# Patient Record
Sex: FEMALE | URBAN - METROPOLITAN AREA
[De-identification: names, ages, dates, MRNs, and addresses within clinical notes are randomized per-mention and may not be internally consistent; named-entity substitution may affect disease eponyms.]

---

## 2024-11-18 ENCOUNTER — APPOINTMENT (OUTPATIENT)
Dept: URBAN - METROPOLITAN AREA SURGERY 12 | Age: 29
Setting detail: DERMATOLOGY
End: 2024-11-18

## 2024-11-18 DIAGNOSIS — D485 NEOPLASM OF UNCERTAIN BEHAVIOR OF SKIN: ICD-10-CM

## 2024-11-18 PROBLEM — D48.5 NEOPLASM OF UNCERTAIN BEHAVIOR OF SKIN: Status: ACTIVE | Noted: 2024-11-18

## 2024-11-18 PROCEDURE — 11102 TANGNTL BX SKIN SINGLE LES: CPT

## 2024-11-18 PROCEDURE — OTHER BIOPSY BY SHAVE METHOD: OTHER

## 2024-11-18 ASSESSMENT — LOCATION ZONE DERM: LOCATION ZONE: FACE

## 2024-11-18 ASSESSMENT — LOCATION DETAILED DESCRIPTION DERM: LOCATION DETAILED: RIGHT FOREHEAD

## 2024-11-18 ASSESSMENT — LOCATION SIMPLE DESCRIPTION DERM: LOCATION SIMPLE: RIGHT FOREHEAD

## 2024-11-18 NOTE — PROCEDURE: BIOPSY BY SHAVE METHOD
Detail Level: Detailed
Depth Of Biopsy: dermis
Was A Bandage Applied: Yes
Size Of Lesion In Cm: 0
Biopsy Type: H and E
Biopsy Method: Dermablade
Anesthesia Type: 1% lidocaine with epinephrine
Anesthesia Volume In Cc: 0.5
Hemostasis: Drysol
Wound Care: Petrolatum
Dressing: bandage
Destruction After The Procedure: No
Type Of Destruction Used: Curettage
Curettage Text: The wound bed was treated with curettage after the biopsy was performed.
Cryotherapy Text: The wound bed was treated with cryotherapy after the biopsy was performed.
Electrodesiccation Text: The wound bed was treated with electrodesiccation after the biopsy was performed.
Electrodesiccation And Curettage Text: The wound bed was treated with electrodesiccation and curettage after the biopsy was performed.
Silver Nitrate Text: The wound bed was treated with silver nitrate after the biopsy was performed.
Lab: [id number]
Lab Facility: [id number]
Consent: Written consent was obtained and risks were reviewed including but not limited to scarring, infection, bleeding, scabbing, incomplete removal, nerve damage and allergy to anesthesia.
Post-Care Instructions: I reviewed with the patient in detail post-care instructions. Patient is to keep the biopsy site dry overnight, and then apply bacitracin twice daily until healed. Patient may apply hydrogen peroxide soaks to remove any crusting.
Notification Instructions: Patient will be notified of biopsy results. However, patient instructed to call the office if not contacted within 2 weeks.
Billing Type: Third-Party Bill
Information: Selecting Yes will display possible errors in your note based on the variables you have selected. This validation is only offered as a suggestion for you. PLEASE NOTE THAT THE VALIDATION TEXT WILL BE REMOVED WHEN YOU FINALIZE YOUR NOTE. IF YOU WANT TO FAX A PRELIMINARY NOTE YOU WILL NEED TO TOGGLE THIS TO 'NO' IF YOU DO NOT WANT IT IN YOUR FAXED NOTE.

## 2024-12-11 ENCOUNTER — APPOINTMENT (OUTPATIENT)
Dept: URBAN - METROPOLITAN AREA SURGERY 11 | Age: 29
Setting detail: DERMATOLOGY
End: 2024-12-11

## 2024-12-11 PROBLEM — C44.91 BASAL CELL CARCINOMA OF SKIN, UNSPECIFIED: Status: ACTIVE | Noted: 2024-12-11

## 2024-12-11 PROCEDURE — OTHER MOHS SURGERY PHONE CONSULTATION: OTHER

## 2024-12-11 NOTE — PROCEDURE: MOHS SURGERY PHONE CONSULTATION
Does The Patient Have A Pacemaker Or Defibrillator?: No
Time Of Mohs Surgery: 8am
Has The Pathology Report Been Received?: Yes
Date Of Mohs Surgery: 01/28/2025
Detail Level: Simple
Additional Information?: no imbruvica.  emailed info/directions
Which Antibiotic Do They Take For Surgical Prophylaxis?: Amoxicillin (2 grams)
Patient Reported Location: d
Office Location Of Mohs Surgery: [place]

## 2025-01-28 ENCOUNTER — APPOINTMENT (OUTPATIENT)
Dept: URBAN - METROPOLITAN AREA SURGERY 11 | Age: 30
Setting detail: DERMATOLOGY
End: 2025-01-28

## 2025-01-28 PROBLEM — C44.319 BASAL CELL CARCINOMA OF SKIN OF OTHER PARTS OF FACE: Status: ACTIVE | Noted: 2025-01-28

## 2025-01-28 PROCEDURE — OTHER MOHS BY LAYER: OTHER

## 2025-01-28 PROCEDURE — 17311 MOHS 1 STAGE H/N/HF/G: CPT

## 2025-01-28 PROCEDURE — OTHER MIPS QUALITY: OTHER

## 2025-01-28 PROCEDURE — 14040 TIS TRNFR F/C/C/M/N/A/G/H/F: CPT

## 2025-01-28 NOTE — PROCEDURE: MOHS BY LAYER
Body Location Override (Optional - Billing Will Still Be Based On Selected Body Map Location If Applicable): right forehead
Detail Level: Detailed
Size Of Lesion: 1.6
Anesthesia Type: 2% lidocaine with epinephrine and a 1:10 solution of sodium bicarbonate and clindamycin
Hemostasis: Electrodesiccation
Estimated Blood Loss (Cc): minimal
Bill For Surgical Tray: no
Number Of Stages: 1
Stage 1: Anesthesia Volume In Cc: 3
Stage 1: Depth Of Layer: dermis
Stage 1: Defect X-Dimension: 1.8
Stage 1: Defect Y-Dimension: 1.2
Stage 2: Number Of Sections (Blocks) ?: 0
Repair Type: Flap
Undermining Type: Entire Wound
Debridement Text: The wound edges were debrided prior to proceeding with the closure to facilitate wound healing.
Helical Rim Text: The closure involved the helical rim.
Vermilion Border Text: The closure involved the vermilion border.
Nostril Rim Text: The closure involved the nostril rim.
Retention Suture Text: Retention sutures were placed to support the closure and prevent dehiscence.
Flap Type: Advancement Flap (Single)
Secondary Defect Length (In Cm): 1.5
Deep Sutures: 5-0 Monocryl
Epidermal Closure: running
Suture Removal: 7 days
Wound Care: No ointment
Consent: The rationale for Mohs was explained to the patient and consent was obtained. The risks, benefits and alternatives to therapy were discussed in detail. Specifically, the risks of infection, scarring, bleeding, prolonged wound healing, incomplete removal, allergy to anesthesia, nerve injury and recurrence were addressed. Prior to the procedure, the treatment site was clearly identified and confirmed by the patient. All components of Universal Protocol/PAUSE Rule completed.  Procedure performed by Dr. Galicia who acts in two integrated and distinct capacities: surgeon and pathologist.
Post-Care Instructions: I reviewed with the patient in detail post-care instructions. Leave brown tape in place.  Patient is not to engage in any heavy lifting, exercise, or swimming for the next 14 days. Should the patient develop any fevers, chills, bleeding, severe pain patient will contact the office immediately.

## 2025-02-04 ENCOUNTER — APPOINTMENT (OUTPATIENT)
Dept: URBAN - METROPOLITAN AREA SURGERY 11 | Age: 30
Setting detail: DERMATOLOGY
End: 2025-02-04

## 2025-02-04 DIAGNOSIS — L57.8 OTHER SKIN CHANGES DUE TO CHRONIC EXPOSURE TO NONIONIZING RADIATION: ICD-10-CM

## 2025-02-04 DIAGNOSIS — L82.1 OTHER SEBORRHEIC KERATOSIS: ICD-10-CM

## 2025-02-04 DIAGNOSIS — D22 MELANOCYTIC NEVI: ICD-10-CM

## 2025-02-04 DIAGNOSIS — Z48.02 ENCOUNTER FOR REMOVAL OF SUTURES: ICD-10-CM

## 2025-02-04 DIAGNOSIS — Z85.828 PERSONAL HISTORY OF OTHER MALIGNANT NEOPLASM OF SKIN: ICD-10-CM

## 2025-02-04 DIAGNOSIS — L81.4 OTHER MELANIN HYPERPIGMENTATION: ICD-10-CM

## 2025-02-04 DIAGNOSIS — D18.0 HEMANGIOMA: ICD-10-CM

## 2025-02-04 PROBLEM — D22.5 MELANOCYTIC NEVI OF TRUNK: Status: ACTIVE | Noted: 2025-02-04

## 2025-02-04 PROBLEM — D18.01 HEMANGIOMA OF SKIN AND SUBCUTANEOUS TISSUE: Status: ACTIVE | Noted: 2025-02-04

## 2025-02-04 PROCEDURE — OTHER REASSURANCE: OTHER

## 2025-02-04 PROCEDURE — OTHER SUTURE REMOVAL (GLOBAL PERIOD): OTHER

## 2025-02-04 PROCEDURE — 99213 OFFICE O/P EST LOW 20 MIN: CPT | Mod: 24

## 2025-02-04 PROCEDURE — OTHER COUNSELING: OTHER

## 2025-02-04 ASSESSMENT — LOCATION SIMPLE DESCRIPTION DERM
LOCATION SIMPLE: RIGHT UPPER BACK
LOCATION SIMPLE: RIGHT FOREHEAD
LOCATION SIMPLE: LEFT UPPER BACK

## 2025-02-04 ASSESSMENT — LOCATION ZONE DERM
LOCATION ZONE: TRUNK
LOCATION ZONE: FACE

## 2025-02-04 ASSESSMENT — LOCATION DETAILED DESCRIPTION DERM
LOCATION DETAILED: LEFT MID-UPPER BACK
LOCATION DETAILED: LEFT SUPERIOR MEDIAL UPPER BACK
LOCATION DETAILED: RIGHT SUPERIOR UPPER BACK
LOCATION DETAILED: LEFT SUPERIOR UPPER BACK
LOCATION DETAILED: RIGHT SUPERIOR FOREHEAD

## 2025-02-04 NOTE — PROCEDURE: SUTURE REMOVAL (GLOBAL PERIOD)
Detail Level: Detailed
Add 96676 Cpt? (Important Note: In 2017 The Use Of 75890 Is Being Tracked By Cms To Determine Future Global Period Reimbursement For Global Periods): no

## 2025-08-06 ENCOUNTER — APPOINTMENT (OUTPATIENT)
Dept: URBAN - METROPOLITAN AREA SURGERY 12 | Age: 30
Setting detail: DERMATOLOGY
End: 2025-08-06

## 2025-08-06 DIAGNOSIS — L81.4 OTHER MELANIN HYPERPIGMENTATION: ICD-10-CM

## 2025-08-06 DIAGNOSIS — Z85.828 PERSONAL HISTORY OF OTHER MALIGNANT NEOPLASM OF SKIN: ICD-10-CM

## 2025-08-06 DIAGNOSIS — Z87.2 PERSONAL HISTORY OF DISEASES OF THE SKIN AND SUBCUTANEOUS TISSUE: ICD-10-CM

## 2025-08-06 DIAGNOSIS — D22 MELANOCYTIC NEVI: ICD-10-CM

## 2025-08-06 DIAGNOSIS — D18.0 HEMANGIOMA: ICD-10-CM

## 2025-08-06 PROBLEM — D22.5 MELANOCYTIC NEVI OF TRUNK: Status: ACTIVE | Noted: 2025-08-06

## 2025-08-06 PROBLEM — D18.01 HEMANGIOMA OF SKIN AND SUBCUTANEOUS TISSUE: Status: ACTIVE | Noted: 2025-08-06

## 2025-08-06 PROCEDURE — OTHER COUNSELING: OTHER

## 2025-08-06 PROCEDURE — OTHER REASSURANCE: OTHER

## 2025-08-06 PROCEDURE — 99213 OFFICE O/P EST LOW 20 MIN: CPT

## 2025-08-06 ASSESSMENT — LOCATION ZONE DERM: LOCATION ZONE: TRUNK

## 2025-08-06 ASSESSMENT — LOCATION DETAILED DESCRIPTION DERM
LOCATION DETAILED: LEFT MID-UPPER BACK
LOCATION DETAILED: RIGHT SUPERIOR UPPER BACK

## 2025-08-06 ASSESSMENT — LOCATION SIMPLE DESCRIPTION DERM
LOCATION SIMPLE: RIGHT UPPER BACK
LOCATION SIMPLE: LEFT UPPER BACK